# Patient Record
Sex: FEMALE | Race: BLACK OR AFRICAN AMERICAN | Employment: PART TIME | URBAN - METROPOLITAN AREA
[De-identification: names, ages, dates, MRNs, and addresses within clinical notes are randomized per-mention and may not be internally consistent; named-entity substitution may affect disease eponyms.]

---

## 2017-09-13 ENCOUNTER — HOSPITAL ENCOUNTER (EMERGENCY)
Age: 22
Discharge: HOME | End: 2017-09-13
Attending: EMERGENCY MEDICINE
Payer: SELF-PAY

## 2017-09-13 VITALS
SYSTOLIC BLOOD PRESSURE: 135 MMHG | OXYGEN SATURATION: 97 % | DIASTOLIC BLOOD PRESSURE: 79 MMHG | HEIGHT: 67 IN | RESPIRATION RATE: 18 BRPM | HEART RATE: 100 BPM | BODY MASS INDEX: 30.61 KG/M2 | TEMPERATURE: 98.2 F | WEIGHT: 195 LBS

## 2017-09-13 DIAGNOSIS — K64.4 EXTERNAL HEMORRHOIDS WITHOUT COMPLICATION: Primary | ICD-10-CM

## 2017-09-13 PROCEDURE — 99282 EMERGENCY DEPT VISIT SF MDM: CPT

## 2017-09-13 RX ORDER — HYDROCORTISONE 25 MG/G
CREAM TOPICAL 4 TIMES DAILY
Qty: 30 G | Refills: 0 | Status: SHIPPED | OUTPATIENT
Start: 2017-09-13

## 2017-09-13 NOTE — DISCHARGE INSTRUCTIONS

## 2017-09-13 NOTE — ED PROVIDER NOTES
HPI Comments: Presenting for eval of hemorrhoids. States they have been present for >1 week, not changed with using OTC medications and home remedies. Denies rectal bleeding, blood in stool, constipation, pregnancy, urinary sx, abdominal pain, NVD or any other complaints. History reviewed. No pertinent past medical history. Patient is a 22 y.o. female presenting with rectal pain. The history is provided by the patient. Anal Pain    The current episode started 5 to 7 days ago. The problem has been unchanged. The pain is mild. The stool is described as soft. Associated symptoms include hematemesis, hemorrhoids and rectal pain. Pertinent negatives include no fever, no abdominal pain, no diarrhea, no nausea, no vomiting, no hematuria, no vaginal bleeding, no vaginal discharge, no chest pain, no headaches, no coughing and no rash. History reviewed. No pertinent past medical history. History reviewed. No pertinent surgical history. History reviewed. No pertinent family history. Social History     Social History    Marital status: N/A     Spouse name: N/A    Number of children: N/A    Years of education: N/A     Occupational History    Not on file. Social History Main Topics    Smoking status: Current Every Day Smoker    Smokeless tobacco: Never Used    Alcohol use No    Drug use: Not on file    Sexual activity: Not on file     Other Topics Concern    Not on file     Social History Narrative    No narrative on file         ALLERGIES: Review of patient's allergies indicates no known allergies. Review of Systems   Constitutional: Negative for chills and fever. HENT: Negative. Negative for congestion and facial swelling. Eyes: Negative for discharge and redness. Respiratory: Negative for cough and shortness of breath. Cardiovascular: Negative for chest pain. Gastrointestinal: Positive for hematemesis, hemorrhoids and rectal pain.  Negative for abdominal pain, diarrhea, nausea and vomiting. Endocrine: Negative. Genitourinary: Negative. Negative for hematuria, vaginal bleeding and vaginal discharge. Rectal hemorrhoids    Musculoskeletal: Negative for back pain and neck pain. Skin: Negative for rash and wound. Allergic/Immunologic: Negative. Neurological: Negative for dizziness, light-headedness and headaches. Hematological: Negative. Psychiatric/Behavioral: Negative. Vitals:    09/13/17 1433   BP: 135/79   Pulse: 100   Resp: 18   Temp: 98.2 °F (36.8 °C)   SpO2: 97%   Weight: 88.5 kg (195 lb)   Height: 5' 7\" (1.702 m)            Physical Exam   Constitutional: She is oriented to person, place, and time. She appears well-developed and well-nourished. No distress. HENT:   Head: Normocephalic and atraumatic. Eyes: Conjunctivae are normal.   Neck: Normal range of motion. Neck supple. Cardiovascular: Normal rate, regular rhythm and normal heart sounds. Pulmonary/Chest: Effort normal and breath sounds normal. No respiratory distress. She has no wheezes. She exhibits no tenderness. Abdominal: Soft. She exhibits no distension. There is no tenderness. Genitourinary:   Genitourinary Comments: Non-thrombosed external hemorrhoid noted at [facility] with no bleeding, swelling appreciated. Pt deferred internal exam.    Musculoskeletal: Normal range of motion. Neurological: She is alert and oriented to person, place, and time. No cranial nerve deficit. Coordination normal.   Skin: Skin is warm and dry. No rash noted. She is not diaphoretic. Psychiatric: She has a normal mood and affect. Nursing note and vitals reviewed. MDM  Number of Diagnoses or Management Options  External hemorrhoids without complication:   Diagnosis management comments: Rx for anusol. No indication for I&D. NO rectal bleeding, pt deferred internal rectal exam.  pcp f/u and return sx given. Strict fiber diet for loose stools and decrease straining.      ED Course       Procedures